# Patient Record
Sex: FEMALE | Race: WHITE | NOT HISPANIC OR LATINO | Employment: FULL TIME | URBAN - METROPOLITAN AREA
[De-identification: names, ages, dates, MRNs, and addresses within clinical notes are randomized per-mention and may not be internally consistent; named-entity substitution may affect disease eponyms.]

---

## 2019-10-22 DIAGNOSIS — R30.0 DYSURIA: Primary | ICD-10-CM

## 2024-01-10 ENCOUNTER — TELEPHONE (OUTPATIENT)
Dept: OBSTETRICS AND GYNECOLOGY | Facility: CLINIC | Age: 59
End: 2024-01-10
Payer: COMMERCIAL

## 2024-01-10 NOTE — TELEPHONE ENCOUNTER
----- Message from Nicky Chance sent at 1/10/2024 12:25 PM CST -----  Contact: Patient  Patient called to consult with nurse or staff regarding an appointment. She states she no longer has [employer] insurance and did not have her insurance information with her to schedule, but wanted to know if she can be scheduled by the clinic. She would like a call back and can be reached at [phone]. Thanks/MR

## 2024-01-10 NOTE — TELEPHONE ENCOUNTER
Spoke with patient. She is an old patient of Dr. Gill and would like to get re-established. Pt has [employer] insurance. Scheduled for 4/17/24 at 2 pm  Katarzyna Arguello

## 2024-04-17 ENCOUNTER — OFFICE VISIT (OUTPATIENT)
Dept: OBSTETRICS AND GYNECOLOGY | Facility: CLINIC | Age: 59
End: 2024-04-17
Payer: COMMERCIAL

## 2024-04-17 VITALS
DIASTOLIC BLOOD PRESSURE: 84 MMHG | BODY MASS INDEX: 20.25 KG/M2 | SYSTOLIC BLOOD PRESSURE: 127 MMHG | HEIGHT: 67 IN | WEIGHT: 129 LBS | HEART RATE: 68 BPM

## 2024-04-17 DIAGNOSIS — Z00.00 ENCOUNTER FOR WELLNESS EXAMINATION: ICD-10-CM

## 2024-04-17 DIAGNOSIS — Z12.4 PAPANICOLAOU SMEAR FOR CERVICAL CANCER SCREENING: Primary | ICD-10-CM

## 2024-04-17 DIAGNOSIS — N95.2 ATROPHIC VAGINITIS: ICD-10-CM

## 2024-04-17 DIAGNOSIS — N39.0 RECURRENT UTI: ICD-10-CM

## 2024-04-17 DIAGNOSIS — Z13.820 SCREENING FOR OSTEOPOROSIS: ICD-10-CM

## 2024-04-17 DIAGNOSIS — N89.8 VAGINAL DRYNESS: ICD-10-CM

## 2024-04-17 PROCEDURE — 99386 PREV VISIT NEW AGE 40-64: CPT | Mod: S$GLB,,, | Performed by: OBSTETRICS & GYNECOLOGY

## 2024-04-17 PROCEDURE — 99459 PELVIC EXAMINATION: CPT | Mod: S$GLB,,, | Performed by: OBSTETRICS & GYNECOLOGY

## 2024-04-17 RX ORDER — ESTRADIOL 0.1 MG/G
2 CREAM VAGINAL
Qty: 42.5 G | Refills: 3 | Status: SHIPPED | OUTPATIENT
Start: 2024-04-18 | End: 2024-05-18

## 2024-04-17 RX ORDER — PHENTERMINE HYDROCHLORIDE 37.5 MG/1
TABLET ORAL
COMMUNITY
End: 2024-04-17 | Stop reason: SDUPTHER

## 2024-04-17 RX ORDER — PHENTERMINE HYDROCHLORIDE 37.5 MG/1
37.5 TABLET ORAL
Qty: 30 TABLET | Refills: 0 | Status: SHIPPED | OUTPATIENT
Start: 2024-04-17

## 2024-04-17 NOTE — PROGRESS NOTES
Subjective     Patient ID: April Osullivan is a 58 y.o. female.    Chief Complaint:  Well Woman (annual)      History of Present Illness  HPI  This is a 58 year old female coming in for her annual exam. She also has complaints of recurrent urinary tract infections pain with intercourse she reports she was seen by Urology they wanted to do a cystoscopy but she refused.  She has tried hyaluronic acid suppositories without much relief.      GYN & OB History  No LMP recorded. Patient is postmenopausal.   Date of Last Pap: No result found    OB History    Para Term  AB Living   4 2 2   2 2   SAB IAB Ectopic Multiple Live Births   2       2      # Outcome Date GA Lbr Gonzalo/2nd Weight Sex Type Anes PTL Lv   4 Term     F Vag-Spont   FARHANA   3 1990           2 Term     M Vag-Spont   FARHANA   1 1988               Review of Systems  Review of Systems   Constitutional:  Negative for fatigue, fever and unexpected weight change.   Respiratory:  Negative for cough and shortness of breath.    Cardiovascular:  Negative for chest pain, palpitations and leg swelling.   Gastrointestinal:  Negative for abdominal pain, bloating, blood in stool, constipation, diarrhea, nausea and vomiting.   Genitourinary:  Positive for dyspareunia and vaginal dryness. Negative for decreased libido, dysmenorrhea, dysuria, frequency, genital sores, hematuria, menorrhagia, menstrual problem, pelvic pain, urgency, vaginal discharge, urinary incontinence and vaginal odor.        Recurrent bladder infections.   Musculoskeletal:  Negative for arthralgias and joint swelling.   Integumentary:  Negative for rash, mole/lesion, breast mass, nipple discharge, breast skin changes and breast tenderness.   Psychiatric/Behavioral: Negative.     Breast: Negative for asymmetry, lump, mass, nipple discharge, skin changes and tenderness         Objective   Physical Exam:   Constitutional: She appears well-developed and well-nourished.      Neck: No  thyroid mass and no thyromegaly present.     Pulmonary/Chest: Chest wall is not dull to percussion. She exhibits no mass, no tenderness, no bony tenderness, no laceration, no crepitus, no edema, no deformity, no swelling and no retraction. Right breast exhibits no inverted nipple, no mass, no nipple discharge, no skin change, no tenderness, presence, no bleeding and no swelling. Left breast exhibits no inverted nipple, no mass, no nipple discharge, no skin change, no tenderness, presence, no bleeding and no swelling.        Abdominal: Soft. Bowel sounds are normal. There is no abdominal tenderness. Hernia confirmed negative in the right inguinal area and confirmed negative in the left inguinal area.     Genitourinary:    Vagina and uterus normal.      Pelvic exam was performed with patient supine.     Labial bartholins normal.There is no rash, tenderness, lesion or injury on the right labia. There is no rash, tenderness, lesion or injury on the left labia. Cervix is normal. Right adnexum displays no mass, no tenderness and no fullness. Left adnexum displays no mass, no tenderness and no fullness. No rectocele, cystocele or prolapse of vaginal walls in the vagina. Vaginal atrophy noted.                Skin: Skin is warm and dry.    Psychiatric: She has a normal mood and affect. Her speech is normal and behavior is normal.       Chaperone present        Assessment and Plan     1. Papanicolaou smear for cervical cancer screening    2. Screening for osteoporosis    3. Vaginal dryness    4. Atrophic vaginitis    5. Recurrent UTI    6. Encounter for wellness examination            Plan:  Papanicolaou smear for cervical cancer screening  -     Liquid-based pap smear, screening    Screening for osteoporosis  -     DXA Bone Density Axial Skeleton 1 or more sites; Future; Expected date: 04/17/2024    Vaginal dryness    Atrophic vaginitis  -     estradioL (ESTRACE) 0.01 % (0.1 mg/gram) vaginal cream; Place 2 g vaginally twice  a week.  Dispense: 42.5 g; Refill: 3    Recurrent UTI    Encounter for wellness examination    Other orders  -     phentermine (ADIPEX-P) 37.5 mg tablet; Take 1 tablet (37.5 mg total) by mouth before breakfast.  Dispense: 30 tablet; Refill: 0      Will add in d mannose

## 2024-04-23 ENCOUNTER — TELEPHONE (OUTPATIENT)
Dept: OBSTETRICS AND GYNECOLOGY | Facility: CLINIC | Age: 59
End: 2024-04-23
Payer: COMMERCIAL

## 2024-04-23 LAB — Lab: NORMAL

## 2024-04-23 NOTE — TELEPHONE ENCOUNTER
----- Message from Ivelisse Lazaro sent at 4/23/2024  3:49 PM CDT -----  Contact: self  Type: Staff Message    Caller: April Osullivan  Call Back Number: [phone]  Nature of the Call:  pt has questions about her medication hormone cream  Additional Information: na

## 2024-04-24 ENCOUNTER — TELEPHONE (OUTPATIENT)
Dept: OBSTETRICS AND GYNECOLOGY | Facility: CLINIC | Age: 59
End: 2024-04-24
Payer: COMMERCIAL

## 2024-04-24 NOTE — TELEPHONE ENCOUNTER
----- Message from Luly Joshi sent at 4/24/2024  3:20 PM CDT -----  Contact: self  Type:  Patient Returning Call    Who Called:April Osullivan  Who Left Message for Patient:unsure  Does the patient know what this is regarding?:pap/results  Would the patient rather a call back or a response via [facility]?   Best Call Back Number:[phone]  Additional Information: n/a

## 2024-06-12 ENCOUNTER — TELEPHONE (OUTPATIENT)
Dept: OBSTETRICS AND GYNECOLOGY | Facility: CLINIC | Age: 59
End: 2024-06-12
Payer: COMMERCIAL

## 2024-06-12 NOTE — TELEPHONE ENCOUNTER
----- Message from Ora Rdz sent at 6/12/2024  1:17 PM CDT -----  Name of Who is Calling:pt          Patient requesting a call regarding medication questions           Can the clinic reply by [facility]:  no         What Number to Call Back if not in [facility]: [phone]

## 2024-06-18 ENCOUNTER — TELEPHONE (OUTPATIENT)
Dept: OBSTETRICS AND GYNECOLOGY | Facility: CLINIC | Age: 59
End: 2024-06-18
Payer: COMMERCIAL

## 2024-06-18 NOTE — TELEPHONE ENCOUNTER
Pt called she will schedule appointment for weight and blood pressure check when provider comes back next week

## 2024-06-18 NOTE — TELEPHONE ENCOUNTER
----- Message from Cheryl Davenport sent at 6/18/2024  9:54 AM CDT -----  States she needs to refill a prescription, she wants to speak to the nurse. Please call pt [phone]. Thank you

## 2024-06-18 NOTE — TELEPHONE ENCOUNTER
----- Message from Luly Joshi sent at 6/18/2024 10:03 AM CDT -----  Contact: self  Type:  Patient Returning Call    Who Called:April Osullivan  Who Left Message for Patient:celso  Does the patient know what this is regarding?:appt  Would the patient rather a call back or a response via [facility]?   Best Call Back Number:[phone]  Additional Information: n/a

## 2025-03-17 DIAGNOSIS — N95.2 ATROPHIC VAGINITIS: ICD-10-CM

## 2025-03-17 RX ORDER — ESTRADIOL 0.1 MG/G
2 CREAM VAGINAL
Qty: 42.5 G | Refills: 0 | Status: SHIPPED | OUTPATIENT
Start: 2025-03-17 | End: 2025-04-16

## 2025-03-17 NOTE — TELEPHONE ENCOUNTER
----- Message from Elizabeth sent at 3/17/2025 10:31 AM CDT -----  Type:  Needs Medical AdviceWho Called: April Gurrola (please be specific): - How long has patient had these symptoms:  -Pharmacy name and phone #:  -Would the patient rather a call back or a response via [facility]?  Best Call Back Number: [phone] Information:  pt needs to speak w./you about a refill of the medication estradioL (ESTRACE) 0.01 % (0.1 mg/gram) vaginal cream

## 2025-04-24 ENCOUNTER — TELEPHONE (OUTPATIENT)
Dept: OBSTETRICS AND GYNECOLOGY | Facility: CLINIC | Age: 60
End: 2025-04-24

## 2025-04-24 ENCOUNTER — PATIENT MESSAGE (OUTPATIENT)
Dept: OBSTETRICS AND GYNECOLOGY | Facility: CLINIC | Age: 60
End: 2025-04-24

## 2025-04-24 NOTE — TELEPHONE ENCOUNTER
Called pt to reschedule her melissa today due to Dr. Gill being stuck in [place] no answer left vm and messaged pt via portal

## 2025-05-01 ENCOUNTER — TELEPHONE (OUTPATIENT)
Dept: OBSTETRICS AND GYNECOLOGY | Facility: CLINIC | Age: 60
End: 2025-05-01
Payer: COMMERCIAL

## 2025-05-01 NOTE — TELEPHONE ENCOUNTER
----- Message from Apiary sent at 5/1/2025  9:50 AM CDT -----  Contact: April  .Type:  Sooner Apoointment RequestCaller is requesting a sooner appointment.  Caller declined first available appointment listed below.  Caller will not accept being placed on the waitlist and is requesting a message be sent to doctor.Name of Caller: April When is the first available appointment? August 28th Symptoms: Last annual canceled, pt needing to be seen sooner than first avail. Would the patient rather a call back or a response via [facility]?  Call Best Call Back Number: .[phone] Information:  Mrn: [id number].Type:  RX Refill RequestWho Called:  April Refill or New Rx: Refill RX Name and Strength:estradioL (ESTRACE) 0.01 % (0.1 mg/gram) vaginal cream How is the patient currently taking it? (ex. 1XDay): As prescribed Is this a 30 day or 90 day RX: 30 Preferred Pharmacy with phone number: [facility] - [place], [place] - [address] [facility] [id number] [facility] [address] [place] [address]Tfmrt: [phone] Fax: [phone] or Mail Order: local Ordering Provider: Dr washington Would the patient rather a call back or a response via [facility]?  Call Best Call Back Number: .[phone] Information:

## 2025-05-09 ENCOUNTER — TELEPHONE (OUTPATIENT)
Dept: OBSTETRICS AND GYNECOLOGY | Facility: CLINIC | Age: 60
End: 2025-05-09
Payer: COMMERCIAL

## 2025-06-02 DIAGNOSIS — N95.2 ATROPHIC VAGINITIS: ICD-10-CM

## 2025-06-02 RX ORDER — ESTRADIOL 0.1 MG/G
2 CREAM VAGINAL
Qty: 42.5 G | Refills: 0 | Status: SHIPPED | OUTPATIENT
Start: 2025-06-02 | End: 2025-06-04 | Stop reason: SDUPTHER

## 2025-06-04 ENCOUNTER — OFFICE VISIT (OUTPATIENT)
Dept: OBSTETRICS AND GYNECOLOGY | Facility: CLINIC | Age: 60
End: 2025-06-04
Payer: COMMERCIAL

## 2025-06-04 VITALS
BODY MASS INDEX: 19.42 KG/M2 | HEART RATE: 85 BPM | DIASTOLIC BLOOD PRESSURE: 79 MMHG | WEIGHT: 124 LBS | SYSTOLIC BLOOD PRESSURE: 133 MMHG

## 2025-06-04 DIAGNOSIS — R63.5 WEIGHT GAIN: ICD-10-CM

## 2025-06-04 DIAGNOSIS — Z00.00 ENCOUNTER FOR WELLNESS EXAMINATION: ICD-10-CM

## 2025-06-04 DIAGNOSIS — N95.2 ATROPHIC VAGINITIS: ICD-10-CM

## 2025-06-04 DIAGNOSIS — Z12.31 ENCOUNTER FOR SCREENING MAMMOGRAM FOR MALIGNANT NEOPLASM OF BREAST: ICD-10-CM

## 2025-06-04 DIAGNOSIS — Z01.419 WELL WOMAN EXAM WITH ROUTINE GYNECOLOGICAL EXAM: Primary | ICD-10-CM

## 2025-06-04 RX ORDER — ESTRADIOL 0.1 MG/G
2 CREAM VAGINAL
Qty: 42.5 G | Refills: 0 | Status: SHIPPED | OUTPATIENT
Start: 2025-06-05 | End: 2025-07-05

## 2025-06-04 RX ORDER — FOLIC ACID 1 MG/1
TABLET ORAL
COMMUNITY
Start: 2025-04-02

## 2025-06-04 RX ORDER — PHENTERMINE HYDROCHLORIDE 37.5 MG/1
37.5 TABLET ORAL
COMMUNITY
End: 2025-06-04 | Stop reason: SDUPTHER

## 2025-06-04 RX ORDER — PHENTERMINE HYDROCHLORIDE 37.5 MG/1
37.5 TABLET ORAL
Qty: 30 TABLET | Refills: 0 | Status: SHIPPED | OUTPATIENT
Start: 2025-06-04

## 2025-06-05 ENCOUNTER — TELEPHONE (OUTPATIENT)
Dept: OBSTETRICS AND GYNECOLOGY | Facility: CLINIC | Age: 60
End: 2025-06-05
Payer: COMMERCIAL

## 2025-06-05 DIAGNOSIS — R63.5 WEIGHT GAIN: ICD-10-CM

## 2025-06-05 RX ORDER — PHENTERMINE HYDROCHLORIDE 37.5 MG/1
37.5 TABLET ORAL
Qty: 30 TABLET | Refills: 0 | OUTPATIENT
Start: 2025-06-05

## 2025-08-01 DIAGNOSIS — N95.2 ATROPHIC VAGINITIS: ICD-10-CM

## 2025-08-01 RX ORDER — ESTRADIOL 0.1 MG/G
2 CREAM VAGINAL
Qty: 42.5 G | Refills: 0 | Status: SHIPPED | OUTPATIENT
Start: 2025-08-04 | End: 2025-09-03

## 2025-08-01 NOTE — TELEPHONE ENCOUNTER
Copied from CRM #[id number]. Topic: Medications - Medication Refill  >> Aug 1, 2025 10:28 AM Robyn wrote:  Type:  RX Refill Request    Who Called: April Osullivan  Refill or New Rx:refill  RX Name and Strength:estradioL (ESTRACE) 0.01 % (0.1 mg/gram) vaginal cream  How is the patient currently taking it? (ex. 1XDay):as prescribed  Is this a 30 day or 90 day RX: 30  Preferred Pharmacy with phone number:   [facility] - [place], [place] - [address]  [facility] [address]  Phone: [phone] Fax: [phone]  Local or Mail Order:local  Ordering Provider: Suma Gill MD  Would the patient rather a call back or a response via [facility]? call  Best Call Back Number:.[phone]  Additional Information:  pt is also requesting to speak with nurse regarding test results from pap